# Patient Record
Sex: MALE | Race: WHITE
[De-identification: names, ages, dates, MRNs, and addresses within clinical notes are randomized per-mention and may not be internally consistent; named-entity substitution may affect disease eponyms.]

---

## 2018-07-11 PROBLEM — Z00.00 ENCOUNTER FOR PREVENTIVE HEALTH EXAMINATION: Status: ACTIVE | Noted: 2018-07-11

## 2018-07-17 ENCOUNTER — APPOINTMENT (OUTPATIENT)
Dept: OTOLARYNGOLOGY | Facility: CLINIC | Age: 68
End: 2018-07-17
Payer: MEDICARE

## 2018-07-17 VITALS
WEIGHT: 250 LBS | HEIGHT: 70 IN | BODY MASS INDEX: 35.79 KG/M2 | SYSTOLIC BLOOD PRESSURE: 110 MMHG | HEART RATE: 83 BPM | DIASTOLIC BLOOD PRESSURE: 79 MMHG

## 2018-07-17 DIAGNOSIS — E11.9 TYPE 2 DIABETES MELLITUS W/OUT COMPLICATIONS: ICD-10-CM

## 2018-07-17 DIAGNOSIS — Z78.9 OTHER SPECIFIED HEALTH STATUS: ICD-10-CM

## 2018-07-17 DIAGNOSIS — Z83.3 FAMILY HISTORY OF DIABETES MELLITUS: ICD-10-CM

## 2018-07-17 DIAGNOSIS — Z87.891 PERSONAL HISTORY OF NICOTINE DEPENDENCE: ICD-10-CM

## 2018-07-17 DIAGNOSIS — I10 ESSENTIAL (PRIMARY) HYPERTENSION: ICD-10-CM

## 2018-07-17 DIAGNOSIS — M89.8X9 OTHER SPECIFIED DISORDERS OF BONE, UNSPECIFIED SITE: ICD-10-CM

## 2018-07-17 DIAGNOSIS — Z82.49 FAMILY HISTORY OF ISCHEMIC HEART DISEASE AND OTHER DISEASES OF THE CIRCULATORY SYSTEM: ICD-10-CM

## 2018-07-17 DIAGNOSIS — Z80.0 FAMILY HISTORY OF MALIGNANT NEOPLASM OF DIGESTIVE ORGANS: ICD-10-CM

## 2018-07-17 PROCEDURE — 31231 NASAL ENDOSCOPY DX: CPT

## 2018-07-17 PROCEDURE — 99205 OFFICE O/P NEW HI 60 MIN: CPT | Mod: 25

## 2018-07-17 RX ORDER — METFORMIN HYDROCHLORIDE 500 MG/1
500 TABLET, COATED ORAL
Refills: 0 | Status: ACTIVE | COMMUNITY

## 2018-07-17 RX ORDER — LOSARTAN POTASSIUM AND HYDROCHLOROTHIAZIDE 12.5; 5 MG/1; MG/1
50-12.5 TABLET ORAL
Refills: 0 | Status: ACTIVE | COMMUNITY

## 2018-07-17 RX ORDER — INSULIN GLARGINE 100 [IU]/ML
100 INJECTION, SOLUTION SUBCUTANEOUS
Refills: 0 | Status: ACTIVE | COMMUNITY

## 2018-07-17 RX ORDER — FENOFIBRATE 160 MG/1
160 TABLET ORAL
Refills: 0 | Status: ACTIVE | COMMUNITY

## 2018-07-17 RX ORDER — METOPROLOL SUCCINATE 100 MG/1
100 TABLET, EXTENDED RELEASE ORAL
Refills: 0 | Status: ACTIVE | COMMUNITY

## 2018-07-17 RX ORDER — INSULIN ASPART 100 [IU]/ML
INJECTION, SOLUTION INTRAVENOUS; SUBCUTANEOUS
Refills: 0 | Status: ACTIVE | COMMUNITY

## 2018-07-23 ENCOUNTER — OUTPATIENT (OUTPATIENT)
Dept: OUTPATIENT SERVICES | Facility: HOSPITAL | Age: 68
LOS: 1 days | End: 2018-07-23
Payer: MEDICARE

## 2018-07-23 VITALS
HEART RATE: 72 BPM | HEIGHT: 68.5 IN | DIASTOLIC BLOOD PRESSURE: 90 MMHG | WEIGHT: 251.99 LBS | TEMPERATURE: 98 F | RESPIRATION RATE: 16 BRPM | SYSTOLIC BLOOD PRESSURE: 138 MMHG

## 2018-07-23 DIAGNOSIS — Z90.49 ACQUIRED ABSENCE OF OTHER SPECIFIED PARTS OF DIGESTIVE TRACT: Chronic | ICD-10-CM

## 2018-07-23 DIAGNOSIS — R22.0 LOCALIZED SWELLING, MASS AND LUMP, HEAD: ICD-10-CM

## 2018-07-23 DIAGNOSIS — E66.9 OBESITY, UNSPECIFIED: ICD-10-CM

## 2018-07-23 DIAGNOSIS — Z96.641 PRESENCE OF RIGHT ARTIFICIAL HIP JOINT: Chronic | ICD-10-CM

## 2018-07-23 DIAGNOSIS — I10 ESSENTIAL (PRIMARY) HYPERTENSION: ICD-10-CM

## 2018-07-23 DIAGNOSIS — E11.9 TYPE 2 DIABETES MELLITUS WITHOUT COMPLICATIONS: ICD-10-CM

## 2018-07-23 DIAGNOSIS — Z46.51 ENCOUNTER FOR FITTING AND ADJUSTMENT OF GASTRIC LAP BAND: Chronic | ICD-10-CM

## 2018-07-23 DIAGNOSIS — E78.5 HYPERLIPIDEMIA, UNSPECIFIED: ICD-10-CM

## 2018-07-23 LAB
BUN SERPL-MCNC: 14 MG/DL — SIGNIFICANT CHANGE UP (ref 7–23)
CALCIUM SERPL-MCNC: 9 MG/DL — SIGNIFICANT CHANGE UP (ref 8.4–10.5)
CHLORIDE SERPL-SCNC: 102 MMOL/L — SIGNIFICANT CHANGE UP (ref 98–107)
CO2 SERPL-SCNC: 26 MMOL/L — SIGNIFICANT CHANGE UP (ref 22–31)
CREAT SERPL-MCNC: 1.25 MG/DL — SIGNIFICANT CHANGE UP (ref 0.5–1.3)
GLUCOSE SERPL-MCNC: 130 MG/DL — HIGH (ref 70–99)
HBA1C BLD-MCNC: 5.9 % — HIGH (ref 4–5.6)
HCT VFR BLD CALC: 46.1 % — SIGNIFICANT CHANGE UP (ref 39–50)
HGB BLD-MCNC: 14.9 G/DL — SIGNIFICANT CHANGE UP (ref 13–17)
MCHC RBC-ENTMCNC: 28.9 PG — SIGNIFICANT CHANGE UP (ref 27–34)
MCHC RBC-ENTMCNC: 32.3 % — SIGNIFICANT CHANGE UP (ref 32–36)
MCV RBC AUTO: 89.3 FL — SIGNIFICANT CHANGE UP (ref 80–100)
NRBC # FLD: 0 — SIGNIFICANT CHANGE UP
PLATELET # BLD AUTO: 260 K/UL — SIGNIFICANT CHANGE UP (ref 150–400)
PMV BLD: 9.7 FL — SIGNIFICANT CHANGE UP (ref 7–13)
POTASSIUM SERPL-MCNC: 4.5 MMOL/L — SIGNIFICANT CHANGE UP (ref 3.5–5.3)
POTASSIUM SERPL-SCNC: 4.5 MMOL/L — SIGNIFICANT CHANGE UP (ref 3.5–5.3)
RBC # BLD: 5.16 M/UL — SIGNIFICANT CHANGE UP (ref 4.2–5.8)
RBC # FLD: 13.1 % — SIGNIFICANT CHANGE UP (ref 10.3–14.5)
SODIUM SERPL-SCNC: 141 MMOL/L — SIGNIFICANT CHANGE UP (ref 135–145)
WBC # BLD: 6.87 K/UL — SIGNIFICANT CHANGE UP (ref 3.8–10.5)
WBC # FLD AUTO: 6.87 K/UL — SIGNIFICANT CHANGE UP (ref 3.8–10.5)

## 2018-07-23 PROCEDURE — 93010 ELECTROCARDIOGRAM REPORT: CPT

## 2018-07-23 RX ORDER — SODIUM CHLORIDE 9 MG/ML
1000 INJECTION, SOLUTION INTRAVENOUS
Qty: 0 | Refills: 0 | Status: DISCONTINUED | OUTPATIENT
Start: 2018-07-25 | End: 2018-08-09

## 2018-07-23 RX ORDER — SODIUM CHLORIDE 9 MG/ML
3 INJECTION INTRAMUSCULAR; INTRAVENOUS; SUBCUTANEOUS EVERY 8 HOURS
Qty: 0 | Refills: 0 | Status: DISCONTINUED | OUTPATIENT
Start: 2018-07-25 | End: 2018-08-09

## 2018-07-23 NOTE — H&P PST ADULT - PROBLEM SELECTOR PLAN 1
Sublabial Biopsy of Premaxillary Tumor, to Direct Laryngoscopy, Esophagoscopy scheduled for 7/25/2018.  Pre-op instructions given. Pt verbalized understanding  Pepcid given for GI prophylaxis  Pending: Medical clearance (Abnormal EKG). Requesting copy of Stress/Echo if available Sublabial Biopsy of Premaxillary Tumor, to Direct Laryngoscopy, Esophagoscopy scheduled for 7/25/2018.  Pre-op instructions given. Pt verbalized understanding  Pepcid given for GI prophylaxis  Pending: Medical clearance (Abnormal EKG), requesting copy of Stress/Echo if available

## 2018-07-23 NOTE — H&P PST ADULT - CONSTITUTIONAL DETAILS
well-developed/well-groomed/no distress/well-nourished well-nourished/no distress/obese/well-developed/well-groomed

## 2018-07-23 NOTE — H&P PST ADULT - HISTORY OF PRESENT ILLNESS
69yo obese male with medical h/o HTN, T2Dm, HDL, reports in 6/2018 he noticed swelling to face (right cheek/nose region), he was evaluated and treated with antibiotics - denies nasal symptoms, or pain to nose/face. Pt presents today for presurgical testing for Sublabial Biopsy of Premaxillary Tumor, to Direct Laryngoscopy, Esophagoscopy scheduled for 7/25/2018. 69yo obese male with medical h/o HTN, T2Dm, HDL, reports in 6/2018 he noticed swelling to face (left cheek/nose region), he was evaluated and treated with antibiotics - denies nasal symptoms, or pain to nose/face. Pt presents today for presurgical testing for Sublabial Biopsy of Premaxillary Tumor, to Direct Laryngoscopy, Esophagoscopy scheduled for 7/25/2018.

## 2018-07-23 NOTE — H&P PST ADULT - PSH
Encounter for fitting of gastric lap band  2008  History of cholecystectomy    History of hip replacement, total, right  4/2018

## 2018-07-23 NOTE — H&P PST ADULT - FAMILY HISTORY
Father  Still living? Unknown  Diabetes mellitus, Age at diagnosis: Age Unknown     Mother  Still living? No  Diabetes mellitus, Age at diagnosis: Age Unknown

## 2018-07-23 NOTE — H&P PST ADULT - NSANTHOSAYNRD_GEN_A_CORE
No. QUOC screening performed.  STOP BANG Legend: 0-2 = LOW Risk; 3-4 = INTERMEDIATE Risk; 5-8 = HIGH Risk

## 2018-07-23 NOTE — H&P PST ADULT - PROBLEM SELECTOR PLAN 4
Accu-Chek ordered STAT for day of procedure  Glucose ordered STAT for day of procedure   Lantus, Novolog, Metformin instructions given Accu-Chek ordered STAT for day of procedure  Glucose ordered STAT for day of procedure   Lantus, Novolog, Metformin instructions given  OR notification done

## 2018-07-23 NOTE — H&P PST ADULT - NEGATIVE CARDIOVASCULAR SYMPTOMS
no orthopnea/no claudication/no dyspnea on exertion/no chest pain/no palpitations/no peripheral edema/no paroxysmal nocturnal dyspnea

## 2018-07-24 ENCOUNTER — TRANSCRIPTION ENCOUNTER (OUTPATIENT)
Age: 68
End: 2018-07-24

## 2018-07-25 ENCOUNTER — OUTPATIENT (OUTPATIENT)
Dept: OUTPATIENT SERVICES | Facility: HOSPITAL | Age: 68
LOS: 1 days | Discharge: HOME | End: 2018-07-25
Payer: MEDICARE

## 2018-07-25 ENCOUNTER — APPOINTMENT (OUTPATIENT)
Dept: OTOLARYNGOLOGY | Facility: HOSPITAL | Age: 68
End: 2018-07-25

## 2018-07-25 ENCOUNTER — RESULT REVIEW (OUTPATIENT)
Age: 68
End: 2018-07-25

## 2018-07-25 VITALS
SYSTOLIC BLOOD PRESSURE: 149 MMHG | HEART RATE: 51 BPM | DIASTOLIC BLOOD PRESSURE: 73 MMHG | OXYGEN SATURATION: 96 % | RESPIRATION RATE: 16 BRPM

## 2018-07-25 VITALS
RESPIRATION RATE: 16 BRPM | WEIGHT: 251.99 LBS | TEMPERATURE: 98 F | HEIGHT: 68.5 IN | OXYGEN SATURATION: 96 % | SYSTOLIC BLOOD PRESSURE: 134 MMHG | HEART RATE: 63 BPM | DIASTOLIC BLOOD PRESSURE: 72 MMHG

## 2018-07-25 DIAGNOSIS — Z46.51 ENCOUNTER FOR FITTING AND ADJUSTMENT OF GASTRIC LAP BAND: Chronic | ICD-10-CM

## 2018-07-25 DIAGNOSIS — Z96.641 PRESENCE OF RIGHT ARTIFICIAL HIP JOINT: Chronic | ICD-10-CM

## 2018-07-25 DIAGNOSIS — Z90.49 ACQUIRED ABSENCE OF OTHER SPECIFIED PARTS OF DIGESTIVE TRACT: Chronic | ICD-10-CM

## 2018-07-25 DIAGNOSIS — R22.0 LOCALIZED SWELLING, MASS AND LUMP, HEAD: ICD-10-CM

## 2018-07-25 PROCEDURE — 31231 NASAL ENDOSCOPY DX: CPT

## 2018-07-25 PROCEDURE — 88312 SPECIAL STAINS GROUP 1: CPT | Mod: 26

## 2018-07-25 PROCEDURE — 88331 PATH CONSLTJ SURG 1 BLK 1SPC: CPT | Mod: 26

## 2018-07-25 PROCEDURE — 88311 DECALCIFY TISSUE: CPT | Mod: 26

## 2018-07-25 PROCEDURE — 88360 TUMOR IMMUNOHISTOCHEM/MANUAL: CPT | Mod: 26

## 2018-07-25 PROCEDURE — 43191 ESOPHAGOSCOPY RIGID TRNSO DX: CPT

## 2018-07-25 PROCEDURE — 88342 IMHCHEM/IMCYTCHM 1ST ANTB: CPT | Mod: 26,59

## 2018-07-25 PROCEDURE — 21013 EXC FACE TUM DEEP < 2 CM: CPT

## 2018-07-25 PROCEDURE — 88305 TISSUE EXAM BY PATHOLOGIST: CPT | Mod: 26

## 2018-07-25 PROCEDURE — 88341 IMHCHEM/IMCYTCHM EA ADD ANTB: CPT | Mod: 26,59

## 2018-07-25 PROCEDURE — 31535 LARYNGOSCOPY W/BIOPSY: CPT

## 2018-07-25 RX ORDER — ONDANSETRON 8 MG/1
4 TABLET, FILM COATED ORAL ONCE
Qty: 0 | Refills: 0 | Status: DISCONTINUED | OUTPATIENT
Start: 2018-07-25 | End: 2018-07-25

## 2018-07-25 RX ORDER — CHLORHEXIDINE GLUCONATE 213 G/1000ML
15 SOLUTION TOPICAL
Qty: 900 | Refills: 0 | OUTPATIENT
Start: 2018-07-25 | End: 2018-08-08

## 2018-07-25 RX ORDER — LOSARTAN/HYDROCHLOROTHIAZIDE 100MG-25MG
1 TABLET ORAL
Qty: 0 | Refills: 0 | COMMUNITY

## 2018-07-25 RX ORDER — FENTANYL CITRATE 50 UG/ML
50 INJECTION INTRAVENOUS
Qty: 0 | Refills: 0 | Status: DISCONTINUED | OUTPATIENT
Start: 2018-07-25 | End: 2018-07-25

## 2018-07-25 RX ORDER — FENTANYL CITRATE 50 UG/ML
25 INJECTION INTRAVENOUS
Qty: 0 | Refills: 0 | Status: DISCONTINUED | OUTPATIENT
Start: 2018-07-25 | End: 2018-07-25

## 2018-07-25 RX ORDER — METOPROLOL TARTRATE 50 MG
1 TABLET ORAL
Qty: 0 | Refills: 0 | COMMUNITY

## 2018-07-25 RX ORDER — INSULIN ASPART 100 [IU]/ML
20 INJECTION, SOLUTION SUBCUTANEOUS
Qty: 0 | Refills: 0 | COMMUNITY

## 2018-07-25 RX ORDER — FENOFIBRATE,MICRONIZED 130 MG
1 CAPSULE ORAL
Qty: 0 | Refills: 0 | COMMUNITY

## 2018-07-25 RX ORDER — METFORMIN HYDROCHLORIDE 850 MG/1
2 TABLET ORAL
Qty: 0 | Refills: 0 | COMMUNITY

## 2018-07-25 RX ORDER — INSULIN GLARGINE 100 [IU]/ML
100 INJECTION, SOLUTION SUBCUTANEOUS
Qty: 0 | Refills: 0 | COMMUNITY

## 2018-07-25 NOTE — ASU DISCHARGE PLAN (ADULT/PEDIATRIC). - NOTIFY
Bleeding that does not stop/Fever greater than 101 Bleeding that does not stop/Persistent Nausea and Vomiting/Fever greater than 101

## 2018-07-25 NOTE — ASU DISCHARGE PLAN (ADULT/PEDIATRIC). - MEDICATION SUMMARY - MEDICATIONS TO TAKE
I will START or STAY ON the medications listed below when I get home from the hospital:    Percocet 5/325 oral tablet  -- 1 tab(s) by mouth every 6 hours MDD:4  -- Caution federal law prohibits the transfer of this drug to any person other  than the person for whom it was prescribed.  May cause drowsiness.  Alcohol may intensify this effect.  Use care when operating dangerous machinery.  This prescription cannot be refilled.  This product contains acetaminophen.  Do not use  with any other product containing acetaminophen to prevent possible liver damage.  Using more of this medication than prescribed may cause serious breathing problems.    -- Indication: For pain    metFORMIN 500 mg oral tablet, extended release  -- 2 tab(s) by mouth 2 times a day  -- Indication: For DM    Lantus 100 units/mL subcutaneous solution  -- 100 unit(s) subcutaneous once a day (at bedtime) PM  -- Indication: For DM    NovoLOG 100 units/mL subcutaneous solution  -- 20 unit(s) subcutaneous 3 times a day (with meals)  -- Indication: For DM    fenofibrate 160 mg oral tablet  -- 1 tab(s) by mouth once a day AM  -- Indication: For supplement    losartan-hydrochlorothiazide 50mg-12.5mg oral tablet  -- 1 tab(s) by mouth once a day AM  -- Indication: For HTN    chlorhexidine 0.12% mucous membrane liquid  -- 15 milliliter(s) by mouth 4 times a day (after meals and at bedtime) swish and spit  -- Indication: For oral care    Metoprolol Succinate ER 50 mg oral tablet, extended release  -- 1 tab(s) by mouth once a day  -- Indication: For HTN

## 2018-08-06 ENCOUNTER — TRANSCRIPTION ENCOUNTER (OUTPATIENT)
Age: 68
End: 2018-08-06

## 2018-08-07 ENCOUNTER — FORM ENCOUNTER (OUTPATIENT)
Age: 68
End: 2018-08-07

## 2018-08-07 PROBLEM — E11.9 TYPE 2 DIABETES MELLITUS WITHOUT COMPLICATIONS: Chronic | Status: ACTIVE | Noted: 2018-07-23

## 2018-08-07 PROBLEM — E66.9 OBESITY, UNSPECIFIED: Chronic | Status: ACTIVE | Noted: 2018-07-23

## 2018-08-07 PROBLEM — E78.5 HYPERLIPIDEMIA, UNSPECIFIED: Chronic | Status: ACTIVE | Noted: 2018-07-23

## 2018-08-07 PROBLEM — I10 ESSENTIAL (PRIMARY) HYPERTENSION: Chronic | Status: ACTIVE | Noted: 2018-07-23

## 2018-08-08 ENCOUNTER — APPOINTMENT (OUTPATIENT)
Dept: NUCLEAR MEDICINE | Facility: CLINIC | Age: 68
End: 2018-08-08
Payer: MEDICARE

## 2018-08-08 ENCOUNTER — OUTPATIENT (OUTPATIENT)
Dept: OUTPATIENT SERVICES | Facility: HOSPITAL | Age: 68
LOS: 1 days | End: 2018-08-08

## 2018-08-08 DIAGNOSIS — Z00.8 ENCOUNTER FOR OTHER GENERAL EXAMINATION: ICD-10-CM

## 2018-08-08 DIAGNOSIS — Z46.51 ENCOUNTER FOR FITTING AND ADJUSTMENT OF GASTRIC LAP BAND: Chronic | ICD-10-CM

## 2018-08-08 DIAGNOSIS — Z90.49 ACQUIRED ABSENCE OF OTHER SPECIFIED PARTS OF DIGESTIVE TRACT: Chronic | ICD-10-CM

## 2018-08-08 DIAGNOSIS — Z96.641 PRESENCE OF RIGHT ARTIFICIAL HIP JOINT: Chronic | ICD-10-CM

## 2018-08-08 PROCEDURE — 78306 BONE IMAGING WHOLE BODY: CPT | Mod: 26

## 2018-08-21 ENCOUNTER — APPOINTMENT (OUTPATIENT)
Dept: OTOLARYNGOLOGY | Facility: CLINIC | Age: 68
End: 2018-08-21
Payer: MEDICARE

## 2018-08-21 ENCOUNTER — FORM ENCOUNTER (OUTPATIENT)
Age: 68
End: 2018-08-21

## 2018-08-21 VITALS
DIASTOLIC BLOOD PRESSURE: 81 MMHG | HEART RATE: 81 BPM | SYSTOLIC BLOOD PRESSURE: 143 MMHG | WEIGHT: 250 LBS | BODY MASS INDEX: 35.87 KG/M2

## 2018-08-21 VITALS — WEIGHT: 238.38 LBS | BODY MASS INDEX: 34.2 KG/M2

## 2018-08-21 DIAGNOSIS — R22.0 LOCALIZED SWELLING, MASS AND LUMP, HEAD: ICD-10-CM

## 2018-08-21 PROCEDURE — 99024 POSTOP FOLLOW-UP VISIT: CPT

## 2018-08-22 ENCOUNTER — OUTPATIENT (OUTPATIENT)
Dept: OUTPATIENT SERVICES | Facility: HOSPITAL | Age: 68
LOS: 1 days | End: 2018-08-22
Payer: MEDICARE

## 2018-08-22 ENCOUNTER — APPOINTMENT (OUTPATIENT)
Dept: MRI IMAGING | Facility: CLINIC | Age: 68
End: 2018-08-22
Payer: MEDICARE

## 2018-08-22 DIAGNOSIS — Z90.49 ACQUIRED ABSENCE OF OTHER SPECIFIED PARTS OF DIGESTIVE TRACT: Chronic | ICD-10-CM

## 2018-08-22 DIAGNOSIS — Z00.8 ENCOUNTER FOR OTHER GENERAL EXAMINATION: ICD-10-CM

## 2018-08-22 DIAGNOSIS — Z96.641 PRESENCE OF RIGHT ARTIFICIAL HIP JOINT: Chronic | ICD-10-CM

## 2018-08-22 DIAGNOSIS — Z46.51 ENCOUNTER FOR FITTING AND ADJUSTMENT OF GASTRIC LAP BAND: Chronic | ICD-10-CM

## 2018-08-22 PROCEDURE — 70543 MRI ORBT/FAC/NCK W/O &W/DYE: CPT

## 2018-08-22 PROCEDURE — A9585: CPT

## 2018-08-22 PROCEDURE — 70543 MRI ORBT/FAC/NCK W/O &W/DYE: CPT | Mod: 26

## 2018-08-27 ENCOUNTER — LABORATORY RESULT (OUTPATIENT)
Age: 68
End: 2018-08-27

## 2018-08-27 ENCOUNTER — APPOINTMENT (OUTPATIENT)
Dept: OTOLARYNGOLOGY | Facility: CLINIC | Age: 68
End: 2018-08-27
Payer: MEDICARE

## 2018-08-27 VITALS — HEART RATE: 75 BPM | DIASTOLIC BLOOD PRESSURE: 80 MMHG | SYSTOLIC BLOOD PRESSURE: 150 MMHG

## 2018-08-27 DIAGNOSIS — R59.0 LOCALIZED ENLARGED LYMPH NODES: ICD-10-CM

## 2018-08-27 PROCEDURE — 10022: CPT

## 2018-08-27 PROCEDURE — 99214 OFFICE O/P EST MOD 30 MIN: CPT | Mod: 25

## 2018-08-27 PROCEDURE — 99024 POSTOP FOLLOW-UP VISIT: CPT | Mod: 25

## 2018-08-27 PROCEDURE — 76942 ECHO GUIDE FOR BIOPSY: CPT

## 2018-08-31 PROBLEM — R22.0 FACIAL MASS: Status: ACTIVE | Noted: 2018-07-17

## 2018-09-13 ENCOUNTER — APPOINTMENT (OUTPATIENT)
Dept: OTOLARYNGOLOGY | Facility: HOSPITAL | Age: 68
End: 2018-09-13

## 2018-10-02 ENCOUNTER — INBOUND DOCUMENT (OUTPATIENT)
Age: 68
End: 2018-10-02

## 2018-10-23 ENCOUNTER — INBOUND DOCUMENT (OUTPATIENT)
Age: 68
End: 2018-10-23

## 2019-05-08 ENCOUNTER — TRANSCRIPTION ENCOUNTER (OUTPATIENT)
Age: 69
End: 2019-05-08

## 2021-10-06 PROBLEM — I10 ESSENTIAL HYPERTENSION: Status: ACTIVE | Noted: 2018-07-17

## 2022-09-22 NOTE — H&P PST ADULT - NSSUBSTANCEUSE_GEN_ALL_CORE_SD
[facility] Consultation / Initial Visit      Today's Date: 9/22/2022    PCP: No Pcp  Referring Provider: Frank Martínez MD    INDICATION FOR CONSULTATION: need cardiac clearance preop      HPI:       67 year old male with HTN, emphysema, KIRAN, HLD, CAD s/p PCI of the LAD, and BPH. Presented to [facility] ED on 9/14/22 with a chief complaint of shortness of breath. Patient was admitted with mild generalized back pain achiness and sudden shortness of breath while on his way to a work conference. Patient was found to have a pneumothorax and  Patient was transferred to [facility] for further management. Patient is currently taking ASA 81 mg, metoprolol, and rosuvastatin at home. Denies chest pain, palpitations, headache, any hx of venous thrombosis, orthopnea or PND, extremity edema, and changes in physical activity. Former smoker for 45 years, quit 6 months ago.     Patient seen lying down in bed. He states that around a week ago he woke up and had shortness of breath. He also endorsed right side pain radiating from his neck. Patient is from [place] and is in town for a work conference. He was travelling to his conference via [employer] and he states that he could not speak \"more than 2 sentences\" to his  without feeling short of breath. He denies any chest pain. Patient had LAD stent placed in the past. He used to go to the gym regularly, however he stopped going around 10 years ago once he started his new job. He states he is not limited in any day-to-day activity.    ROS:     General: No fever or chills, No loss of appetite.    RS: No hemoptysis  GI: No melena or hematochezia  : No urinary disturbance  Derm: No skin disorders   Heme: No blood dyscrasias.  Remainder of 12 point systems reviewed and negative (or as mentioned in HPI)    Relevant Past Medical History:  Past Medical History:   Diagnosis Date   • BPH (benign prostatic hyperplasia)    • Coronary artery disease     w/stents   • Emphysema of lung (CMS/HCC)     • Emphysematous bleb (CMS/HCC)    • Essential (primary) hypertension    • High cholesterol       Past Surgical History:   Procedure Laterality Date   • Cardiac catherization          Social History:  Social History     Tobacco Use   Smoking Status Former Smoker   • Packs/day: 20.00   • Types: Cigarettes   • Quit date: 2022   • Years since quittin.2   Smokeless Tobacco Never Used     Social History     Substance and Sexual Activity   Alcohol Use Yes   • Alcohol/week: 1.0 standard drink   • Types: 1 Cans of beer per week     History   Drug Use Unknown       Family History:   History reviewed. No pertinent family history.    Inpatient Medications  Current Facility-Administered Medications   Medication Dose Route Frequency Provider Last Rate Last Admin   • sodium chloride (PF) 0.9 % injection 2 mL  2 mL Intracatheter 2 times per day Silas Lopez MD   2 mL at 22 0842   • enoxaparin (LOVENOX) injection 40 mg  40 mg Subcutaneous Daily Silas Lopez MD       • metoPROLOL succinate (TOPROL-XL) ER tablet 25 mg  25 mg Oral Daily Silas Lopez MD   25 mg at 22   • aspirin chewable 81 mg  81 mg Oral Daily Silas Lopez MD       • tamsulosin (FLOMAX) capsule 0.4 mg  0.4 mg Oral Daily PC Silas Lopez MD   0.4 mg at 22   • rosuvastatin (CRESTOR) tablet 40 mg  40 mg Oral Daily Silas Lopez MD   40 mg at 2224   • fluticasone-umeclidin-vilanterol (TRELEGY ELLIPTA) 100-62.5-25 MCG/INH inhaler 1 puff  1 puff Inhalation Daily Resp Silas Lopez MD       • Potassium Standard Replacement Protocol (Levels 3.5 and lower)   Does not apply See Admin Instructions Silas Lopez MD       • Potassium Replacement (Levels 3.6 - 4)   Does not apply See Admin Instructions Silas Lopez MD       • Magnesium Standard Replacement Protocol   Does not apply See Admin Instructions Silas Lopez MD       • Phosphorus Standard Replacement Protocol   Does not apply See Admin Instructions Silas Lopez MD       • influenza virus quadrivalent  vaccine inactivated (PRESERVATIVE FREE) injection 0.5 mL  0.5 mL Intramuscular Once Silas Lopez MD          Current Facility-Administered Medications   Medication Dose Route Frequency Provider Last Rate Last Admin   • lactated ringers infusion   Intravenous Continuous Delroy Turcios MD 75 mL/hr at 09/22/22 0659 Rate Verify at 09/22/22 0659      Current Facility-Administered Medications   Medication Dose Route Frequency Provider Last Rate Last Admin   • sodium chloride 0.9 % flush bag 25 mL  25 mL Intravenous PRN Silas Lopez MD       • ipratropium-albuterol (DUONEB) 0.5-2.5 (3) MG/3ML nebulizer solution 3 mL  3 mL Nebulization Q4H Resp PRN Silas Lopez MD       • sodium chloride 0.9 % flush bag 25 mL  25 mL Intravenous PRN Silas Lopez MD       • polyethylene glycol (MIRALAX) packet 17 g  17 g Oral Daily PRN Silas Lopez MD       • docusate sodium-sennosides (SENOKOT S) 50-8.6 MG 2 tablet  2 tablet Oral Daily PRN Silas Lopez MD       • acetaminophen (TYLENOL) tablet 650 mg  650 mg Oral Q4H PRN Silas Lopez MD   650 mg at 09/22/22 0101   • melatonin tablet 6 mg  6 mg Oral Nightly PRN Silas Lopez MD       • HYDROcodone-acetaminophen (NORCO) 5-325 MG per tablet 1 tablet  1 tablet Oral Q4H PRN YOLY Severino   1 tablet at 09/22/22 0839        Allergy   ALLERGIES:  No Known Allergies         Examination:      Vital Last Value 24 Hour Range   Temperature 97.2 °F (36.2 °C) (09/22/22 1123) Temp  Min: 97.2 °F (36.2 °C)  Max: 98.2 °F (36.8 °C)   Pulse (!) 57 (09/22/22 1200) Pulse  Min: 53  Max: 75   Respiratory (!) 21 (09/22/22 1200) Resp  Min: 15  Max: 25   Non-Invasive  Blood Pressure 121/79 (09/22/22 1200) BP  Min: 109/90  Max: 157/84   Pulse Oximetry 94 % (09/22/22 1200) SpO2  Min: 94 %  Max: 99 %   Arterial   Blood Pressure   No data recorded       Intake/Output Summary (Last 24 hours) at 9/22/2022 1343  Last data filed at 9/22/2022 1200  Gross per 24 hour   Intake 441.21 ml   Output 1500 ml   Net -1058.79 ml        Weight     09/21/22 2316   Weight: 86.2 kg (190 lb)         GENERAL: No apparent distress  HEENT: Normocephalic.  Neck:  Supple neck.   Oral mucosa : Pink and moist.    Endocrine: There is no goiter.  CVS: Regular rate and rhythm.  Normal first and second heart tones.   Lung fields: Clear to auscultation bilaterally.   GI: Soft. Nontender, nondistended.    Lower extremity: No cyanosis, clubbing or edema.   Peripheral vascular: Both lower extremities are warm and well perfused.    Neuro: Awake and alert.  Nonfocal examination.  Psych: Appropriate mood and affect  Integumentary: Warm and Dry      Clinical Data:       The following were personally visualized and interpreted by me:    LABS:    CBC  Recent Labs   Lab 09/22/22 0049   WBC 11.5*   HCT 46.6   HGB 15.4          CMP  Recent Labs   Lab 09/22/22 0049   SODIUM 141   POTASSIUM 4.7   CHLORIDE 108   CO2 31   GLUCOSE 110*   BUN 19   CREATININE 0.90   CALCIUM 9.2   TOTPROTEIN 6.1*   ALBUMIN 3.3*   BILIRUBIN 0.4   AST 20   GPT 30   ALKPT 49       Cardiac Labs  No results found    Lipid Panel  No results found    Coags  Recent Labs   Lab 09/22/22 0049   INR 1.0   PTT 28       ABG  No results found    IMAGING:    ECG:   Encounter Date: 09/21/22   Electrocardiogram 12-Lead   Result Value    Ventricular Rate EKG/Min (BPM) 54    Atrial Rate (BPM) 54    SD-Interval (MSEC) 134    QRS-Interval (MSEC) 84    QT-Interval (MSEC) 426    QTc 404    P Axis (Degrees) 64    R Axis (Degrees) 35    T Axis (Degrees) 29    REPORT TEXT      Sinus bradycardia  Otherwise normal ECG  No previous ECGs available  Confirmed by ASHELY LEE MD ([id number]) on 9/22/2022 1:12:37 PM          Carotid Duplex 2/24/22  Impression:   1.  Small amount of calcified plaque in both carotid bulbs, not   hemodynamically significant.   2.  Normal subclavian and vertebral flows on both sides.      Echo Stress Test 11/15/18  FINAL CONCLUSIONS   1- This is a normal stress echo. He did not have chest pain during the    test. Mildly abnormal ST change is likely a false positive response.   2- Exercise tolerance is good. The Mendoza score is >6, which is   considered low risk and associated with good prognosis.   3- hemodynamic response to exercise was appropriate.   4- The left ventricular systolic function is normal.   5- The resting echo is reported separately    TTE 11/15/18  CONCLUSIONS   1- Left ventricular systolic funciton is normal.   2- There is mild LVH with indeterminate diastolic function.   3- Right ventricle is borderline dilated with normal function.   4- There is biatrial enlargement.   5- Mild mitral and mild tricuspid regurgitation is noted.   6- Estimated pulmonary pressure is normal.   7- Compared to the previous images from 8/22/2017, no significant   changes are noted. The difference in the RV and RA measurements   are likely due to technical variability.       Cath Report:  No results found for this or any previous visit.      Assessment/Plans:     Shortness of Breath  Persistent Pneumothorax  - CT reviewed  - Suspected persistent air leak and pneumothorax due to ruptured bleb  - mgmt per thoracic surgery, plan for R VATS, bleb resection, mechanical pleurodesis, possible thoracotomy given the persistence of his symptoms      Pre-op Evaluation  - EKG ordered  - On 3L NC  - Denies anginal symptoms  - Echo from outside records 11/2018 shows normal LV function, mild mitral and tricuspid regurgitation, normal pulmonary pressure  - Normal stress echo in 2018   - Cardiac MRI 2019 was unremarkable  - Patient with good functional capacity, can climb 2 flight of stairs with no limitations  - Patient is intermediate risk for surgery no further cardiovascular workup indicated prior to surgery    Hypertension  - /72 this am  - On toprol-XL 25mg PO daily     Coronary Artery Disease s/p PCI of the LAD  - No chest pain or SOB  - Continue statin, metroprolol, aspirin    Hyperlipidemia  - On rosuvastatin 40mg PO daily  -  Lipid panel 21 revealed CHOL: 117, HDL: 43, LDL: 63, TRI    Emphysema  COPD  - mgmt per primary    Obstructive Sleep Apnea  - On CPAP    Benign Prostatic Hyperplasia  Emphysema  - mgmt per primary      Thank you for allowing us to participate in this patient's care.  Please do not hesitate to call with any questions or concerns.    Scribe Attestation: Entered by Ritesh Li acting as scribe for Dr. Carnes    The documentation recorded by the scribe accurately and completely reflects the service(s) I personally performed and the decisions made by me.        caffeine

## 2023-01-23 ENCOUNTER — OFFICE (OUTPATIENT)
Dept: URBAN - METROPOLITAN AREA CLINIC 12 | Facility: CLINIC | Age: 73
Setting detail: OPHTHALMOLOGY
End: 2023-01-23
Payer: MEDICARE

## 2023-01-23 DIAGNOSIS — H25.13: ICD-10-CM

## 2023-01-23 DIAGNOSIS — E11.3293: ICD-10-CM

## 2023-01-23 DIAGNOSIS — H35.372: ICD-10-CM

## 2023-01-23 PROCEDURE — 92014 COMPRE OPH EXAM EST PT 1/>: CPT | Performed by: OPHTHALMOLOGY

## 2023-01-23 PROCEDURE — 92250 FUNDUS PHOTOGRAPHY W/I&R: CPT | Performed by: OPHTHALMOLOGY

## 2023-01-23 ASSESSMENT — REFRACTION_MANIFEST
OS_SPHERE: +1.25
OS_AXIS: 67
OS_CYLINDER: -1.00
OD_ADD: +2.50
OD_VA1: 20/25
OD_SPHERE: +0.25
OS_ADD: +2.50
OD_CYLINDER: -0.75
OD_AXIS: 96

## 2023-01-23 ASSESSMENT — REFRACTION_AUTOREFRACTION
OS_CYLINDER: -1.00
OS_AXIS: 67
OD_CYLINDER: -0.75
OD_SPHERE: +0.25
OD_AXIS: 96
OS_SPHERE: +1.25

## 2023-01-23 ASSESSMENT — REFRACTION_CURRENTRX
OS_SPHERE: +1.25
OS_ADD: +1.50
OS_AXIS: 0
OS_CYLINDER: 0.00
OS_OVR_VA: 20/
OD_OVR_VA: 20/
OD_CYLINDER: -0.50
OD_ADD: +1.25
OD_AXIS: 106
OD_SPHERE: +1.25

## 2023-01-23 ASSESSMENT — KERATOMETRY
OS_AXISANGLE_DEGREES: 115
OD_K2POWER_DIOPTERS: 45.75
OD_K1POWER_DIOPTERS: 45.25
OS_K2POWER_DIOPTERS: 45.50
OD_AXISANGLE_DEGREES: 65
OS_K1POWER_DIOPTERS: 44.75

## 2023-01-23 ASSESSMENT — VISUAL ACUITY
OS_BCVA: 20/30-1
OD_BCVA: 20/30-1

## 2023-01-23 ASSESSMENT — TONOMETRY
OS_IOP_MMHG: 17
OD_IOP_MMHG: 15

## 2023-01-23 ASSESSMENT — CONFRONTATIONAL VISUAL FIELD TEST (CVF)
OS_FINDINGS: FULL
OD_FINDINGS: FULL

## 2023-01-23 ASSESSMENT — SPHEQUIV_DERIVED
OD_SPHEQUIV: -0.125
OS_SPHEQUIV: 0.75
OS_SPHEQUIV: 0.75
OD_SPHEQUIV: -0.125

## 2023-01-23 ASSESSMENT — AXIALLENGTH_DERIVED
OD_AL: 22.9254
OS_AL: 22.7351
OD_AL: 22.9254
OS_AL: 22.7351

## 2023-05-15 NOTE — H&P PST ADULT - PMH
I have reviewed discharge instructions with the patient and spouse. The patient and spouse verbalized understanding. Diabetes mellitus    Hyperlipidemia    Hypertension    Obesity

## 2023-10-16 ENCOUNTER — OFFICE (OUTPATIENT)
Dept: URBAN - METROPOLITAN AREA CLINIC 12 | Facility: CLINIC | Age: 73
Setting detail: OPHTHALMOLOGY
End: 2023-10-16
Payer: MEDICARE

## 2023-10-16 DIAGNOSIS — E11.3293: ICD-10-CM

## 2023-10-16 DIAGNOSIS — H25.13: ICD-10-CM

## 2023-10-16 DIAGNOSIS — H35.372: ICD-10-CM

## 2023-10-16 PROCEDURE — 92134 CPTRZ OPH DX IMG PST SGM RTA: CPT | Performed by: OPHTHALMOLOGY

## 2023-10-16 PROCEDURE — 99214 OFFICE O/P EST MOD 30 MIN: CPT | Performed by: OPHTHALMOLOGY

## 2023-10-16 ASSESSMENT — REFRACTION_CURRENTRX
OS_AXIS: 0
OS_ADD: +1.50
OD_OVR_VA: 20/
OD_ADD: +1.25
OD_AXIS: 106
OS_OVR_VA: 20/
OS_CYLINDER: 0.00
OD_CYLINDER: -0.50
OD_SPHERE: +1.25
OS_SPHERE: +1.25

## 2023-10-16 ASSESSMENT — KERATOMETRY
OD_K2POWER_DIOPTERS: 45.50
OS_K2POWER_DIOPTERS: 45.25
OD_K1POWER_DIOPTERS: 45.25
OD_AXISANGLE_DEGREES: 083
OS_K1POWER_DIOPTERS: 45.00
OS_AXISANGLE_DEGREES: 133

## 2023-10-16 ASSESSMENT — REFRACTION_MANIFEST
OS_CYLINDER: -1.25
OD_SPHERE: PLANO
OS_AXIS: 67
OS_SPHERE: +1.25
OS_ADD: +2.50
OS_VA1: 20/25-1
OS_SPHERE: +1.25
OS_CYLINDER: -1.00
OD_AXIS: 089
OD_CYLINDER: -0.75
OD_VA1: 20/20
OD_AXIS: 96
OD_SPHERE: +0.25
OD_VA1: 20/25
OD_CYLINDER: -0.75
OS_AXIS: 073
OD_ADD: +2.50

## 2023-10-16 ASSESSMENT — VISUAL ACUITY
OS_BCVA: 20/40
OD_BCVA: 20/30-1

## 2023-10-16 ASSESSMENT — AXIALLENGTH_DERIVED
OD_AL: 22.9688
OS_AL: 22.7351
OS_AL: 22.7804
OS_AL: 22.7804

## 2023-10-16 ASSESSMENT — REFRACTION_AUTOREFRACTION
OD_SPHERE: PLANO
OD_CYLINDER: -0.75
OD_AXIS: 089
OS_SPHERE: +1.25
OS_AXIS: 073
OS_CYLINDER: -1.25

## 2023-10-16 ASSESSMENT — SPHEQUIV_DERIVED
OS_SPHEQUIV: 0.75
OD_SPHEQUIV: -0.125
OS_SPHEQUIV: 0.625
OS_SPHEQUIV: 0.625

## 2023-10-16 ASSESSMENT — TONOMETRY
OS_IOP_MMHG: 14
OD_IOP_MMHG: 14

## 2023-10-16 ASSESSMENT — CONFRONTATIONAL VISUAL FIELD TEST (CVF)
OS_FINDINGS: FULL
OD_FINDINGS: FULL

## 2023-10-23 ENCOUNTER — OFFICE (OUTPATIENT)
Dept: URBAN - METROPOLITAN AREA CLINIC 12 | Facility: CLINIC | Age: 73
Setting detail: OPHTHALMOLOGY
End: 2023-10-23
Payer: MEDICARE

## 2023-10-23 DIAGNOSIS — H25.13: ICD-10-CM

## 2023-10-23 DIAGNOSIS — E11.3293: ICD-10-CM

## 2023-10-23 DIAGNOSIS — H25.12: ICD-10-CM

## 2023-10-23 PROCEDURE — 92136 OPHTHALMIC BIOMETRY: CPT | Mod: 26,LT | Performed by: OPHTHALMOLOGY

## 2023-10-23 PROCEDURE — 92136 OPHTHALMIC BIOMETRY: CPT | Mod: TC | Performed by: OPHTHALMOLOGY

## 2023-10-23 PROCEDURE — 99213 OFFICE O/P EST LOW 20 MIN: CPT | Performed by: OPHTHALMOLOGY

## 2023-10-23 ASSESSMENT — REFRACTION_MANIFEST
OS_ADD: +2.50
OD_SPHERE: PLANO
OD_VA1: 20/20
OD_AXIS: 96
OS_SPHERE: +1.25
OS_VA1: 20/25-1
OD_CYLINDER: -0.75
OS_CYLINDER: -1.00
OD_ADD: +2.50
OS_CYLINDER: -1.25
OS_SPHERE: +1.25
OS_AXIS: 073
OD_CYLINDER: -0.75
OD_AXIS: 089
OD_SPHERE: +0.25
OS_AXIS: 67
OD_VA1: 20/25

## 2023-10-23 ASSESSMENT — SPHEQUIV_DERIVED
OS_SPHEQUIV: 0.625
OS_SPHEQUIV: 0.75
OS_SPHEQUIV: 0.625
OD_SPHEQUIV: -0.125

## 2023-10-23 ASSESSMENT — REFRACTION_AUTOREFRACTION
OS_SPHERE: +1.00
OD_CYLINDER: -0.75
OD_SPHERE: PLANO
OD_AXIS: 107
OS_CYLINDER: -0.75
OS_AXIS: 066

## 2023-10-23 ASSESSMENT — REFRACTION_CURRENTRX
OS_CYLINDER: 0.00
OD_CYLINDER: -0.50
OS_ADD: +1.50
OS_SPHERE: +1.25
OS_AXIS: 0
OD_ADD: +1.25
OD_OVR_VA: 20/
OD_AXIS: 106
OD_SPHERE: +1.25
OS_OVR_VA: 20/

## 2023-10-23 ASSESSMENT — TONOMETRY
OS_IOP_MMHG: 12
OD_IOP_MMHG: 12

## 2023-10-23 ASSESSMENT — KERATOMETRY
OS_K2POWER_DIOPTERS: 45.25
OD_AXISANGLE_DEGREES: 022
OS_AXISANGLE_DEGREES: 110
OS_K1POWER_DIOPTERS: 45.00
OD_K1POWER_DIOPTERS: 45.00
OD_K2POWER_DIOPTERS: 45.75

## 2023-10-23 ASSESSMENT — CONFRONTATIONAL VISUAL FIELD TEST (CVF)
OD_FINDINGS: FULL
OS_FINDINGS: FULL

## 2023-10-23 ASSESSMENT — VISUAL ACUITY
OD_BCVA: 20/30+2
OS_BCVA: 20/40

## 2023-10-23 ASSESSMENT — AXIALLENGTH_DERIVED
OD_AL: 22.9688
OS_AL: 22.7351
OS_AL: 22.7804
OS_AL: 22.7804

## 2023-10-31 ENCOUNTER — ASC (OUTPATIENT)
Dept: URBAN - METROPOLITAN AREA SURGERY 8 | Facility: SURGERY | Age: 73
Setting detail: OPHTHALMOLOGY
End: 2023-10-31
Payer: MEDICARE

## 2023-10-31 DIAGNOSIS — H52.212: ICD-10-CM

## 2023-10-31 DIAGNOSIS — H25.12: ICD-10-CM

## 2023-10-31 PROCEDURE — 66984 XCAPSL CTRC RMVL W/O ECP: CPT | Mod: LT | Performed by: OPHTHALMOLOGY

## 2023-10-31 PROCEDURE — 68841 INSJ RX ELUT IMPLT LAC CANAL: CPT | Mod: LT | Performed by: OPHTHALMOLOGY

## 2023-10-31 PROCEDURE — A9270 NON-COVERED ITEM OR SERVICE: HCPCS | Mod: GY | Performed by: OPHTHALMOLOGY

## 2023-10-31 PROCEDURE — FEMTO FEMTOSECOND LASER: Mod: GY | Performed by: OPHTHALMOLOGY

## 2023-11-01 ENCOUNTER — RX ONLY (RX ONLY)
Age: 73
End: 2023-11-01

## 2023-11-01 ENCOUNTER — OFFICE (OUTPATIENT)
Dept: URBAN - METROPOLITAN AREA CLINIC 12 | Facility: CLINIC | Age: 73
Setting detail: OPHTHALMOLOGY
End: 2023-11-01
Payer: MEDICARE

## 2023-11-01 DIAGNOSIS — Z96.1: ICD-10-CM

## 2023-11-01 PROBLEM — H25.11 CATARACT SENILE NUCLEAR SCLEROSIS; RIGHT EYE: Status: ACTIVE | Noted: 2023-10-16

## 2023-11-01 PROCEDURE — 99024 POSTOP FOLLOW-UP VISIT: CPT | Performed by: OPTOMETRIST

## 2023-11-01 ASSESSMENT — REFRACTION_CURRENTRX
OD_SPHERE: +1.25
OD_OVR_VA: 20/
OS_CYLINDER: 0.00
OD_ADD: +1.25
OS_OVR_VA: 20/
OD_CYLINDER: -0.50
OD_AXIS: 106
OS_AXIS: 0
OS_ADD: +1.50
OS_SPHERE: +1.25

## 2023-11-01 ASSESSMENT — REFRACTION_MANIFEST
OD_VA1: 20/20
OS_AXIS: 67
OS_CYLINDER: -1.25
OS_SPHERE: +1.25
OD_SPHERE: +0.25
OD_AXIS: 96
OD_SPHERE: PLANO
OD_AXIS: 089
OD_CYLINDER: -0.75
OS_VA1: 20/25-1
OS_CYLINDER: -1.00
OS_AXIS: 073
OD_ADD: +2.50
OD_CYLINDER: -0.75
OS_SPHERE: +1.25
OD_VA1: 20/25
OS_ADD: +2.50

## 2023-11-01 ASSESSMENT — REFRACTION_AUTOREFRACTION
OS_SPHERE: -0.25
OD_CYLINDER: -0.75
OS_CYLINDER: -0.75
OD_AXIS: 112
OD_SPHERE: -0.25
OS_AXIS: 037

## 2023-11-01 ASSESSMENT — CONFRONTATIONAL VISUAL FIELD TEST (CVF)
OS_FINDINGS: FULL
OD_FINDINGS: FULL

## 2023-11-01 ASSESSMENT — SPHEQUIV_DERIVED
OS_SPHEQUIV: 0.625
OD_SPHEQUIV: -0.125
OS_SPHEQUIV: -0.625
OD_SPHEQUIV: -0.625
OS_SPHEQUIV: 0.75

## 2023-11-09 ENCOUNTER — OFFICE (OUTPATIENT)
Dept: URBAN - METROPOLITAN AREA CLINIC 12 | Facility: CLINIC | Age: 73
Setting detail: OPHTHALMOLOGY
End: 2023-11-09
Payer: MEDICARE

## 2023-11-09 DIAGNOSIS — H25.11: ICD-10-CM

## 2023-11-09 PROCEDURE — 92136 OPHTHALMIC BIOMETRY: CPT | Mod: 26,RT | Performed by: OPHTHALMOLOGY

## 2023-11-09 ASSESSMENT — SPHEQUIV_DERIVED
OD_SPHEQUIV: -0.125
OS_SPHEQUIV: -0.125
OS_SPHEQUIV: 0.625
OD_SPHEQUIV: -0.625
OS_SPHEQUIV: 0.75

## 2023-11-09 ASSESSMENT — REFRACTION_MANIFEST
OD_SPHERE: PLANO
OS_CYLINDER: -1.00
OS_ADD: +2.50
OD_CYLINDER: -0.75
OD_SPHERE: +0.25
OD_CYLINDER: -0.75
OD_ADD: +2.50
OD_VA1: 20/20
OS_AXIS: 073
OD_AXIS: 96
OS_VA1: 20/25-1
OD_AXIS: 089
OS_CYLINDER: -1.25
OS_SPHERE: +1.25
OS_AXIS: 67
OS_SPHERE: +1.25
OD_VA1: 20/25

## 2023-11-09 ASSESSMENT — REFRACTION_AUTOREFRACTION
OS_AXIS: 064
OD_SPHERE: -0.25
OD_AXIS: 096
OS_CYLINDER: -0.75
OS_SPHERE: +0.25
OD_CYLINDER: -0.75

## 2023-11-09 ASSESSMENT — REFRACTION_CURRENTRX
OS_AXIS: 0
OD_CYLINDER: -0.50
OD_SPHERE: +1.25
OD_OVR_VA: 20/
OS_ADD: +1.50
OS_SPHERE: +1.25
OS_CYLINDER: 0.00
OS_OVR_VA: 20/
OD_AXIS: 106
OD_ADD: +1.25

## 2023-11-09 ASSESSMENT — CONFRONTATIONAL VISUAL FIELD TEST (CVF)
OS_FINDINGS: FULL
OD_FINDINGS: FULL

## 2023-11-14 ENCOUNTER — ASC (OUTPATIENT)
Dept: URBAN - METROPOLITAN AREA SURGERY 8 | Facility: SURGERY | Age: 73
Setting detail: OPHTHALMOLOGY
End: 2023-11-14
Payer: MEDICARE

## 2023-11-14 DIAGNOSIS — H52.211: ICD-10-CM

## 2023-11-14 DIAGNOSIS — H25.11: ICD-10-CM

## 2023-11-14 PROCEDURE — FEMTO FEMTOSECOND LASER: Mod: GY | Performed by: OPHTHALMOLOGY

## 2023-11-14 PROCEDURE — A9270 NON-COVERED ITEM OR SERVICE: HCPCS | Mod: GY | Performed by: OPHTHALMOLOGY

## 2023-11-14 PROCEDURE — 66984 XCAPSL CTRC RMVL W/O ECP: CPT | Mod: 79,RT | Performed by: OPHTHALMOLOGY

## 2023-11-14 PROCEDURE — 68841 INSJ RX ELUT IMPLT LAC CANAL: CPT | Mod: 79,RT | Performed by: OPHTHALMOLOGY

## 2023-11-15 ENCOUNTER — OFFICE (OUTPATIENT)
Dept: URBAN - METROPOLITAN AREA CLINIC 12 | Facility: CLINIC | Age: 73
Setting detail: OPHTHALMOLOGY
End: 2023-11-15
Payer: MEDICARE

## 2023-11-15 DIAGNOSIS — Z96.1: ICD-10-CM

## 2023-11-15 PROCEDURE — 99024 POSTOP FOLLOW-UP VISIT: CPT | Performed by: OPTOMETRIST

## 2023-11-15 ASSESSMENT — REFRACTION_AUTOREFRACTION
OD_AXIS: 106
OD_SPHERE: +0.50
OS_CYLINDER: -1.00
OD_CYLINDER: -1.00
OS_AXIS: 69
OS_SPHERE: +0.50

## 2023-11-15 ASSESSMENT — REFRACTION_MANIFEST
OS_AXIS: 073
OD_VA1: 20/20
OD_SPHERE: +0.25
OS_CYLINDER: -1.25
OD_AXIS: 96
OD_VA1: 20/25
OD_AXIS: 089
OS_VA1: 20/25-1
OD_SPHERE: PLANO
OD_ADD: +2.50
OD_CYLINDER: -0.75
OD_CYLINDER: -0.75
OS_SPHERE: +1.25
OS_AXIS: 67
OS_SPHERE: +1.25
OS_CYLINDER: -1.00
OS_ADD: +2.50

## 2023-11-15 ASSESSMENT — SPHEQUIV_DERIVED
OD_SPHEQUIV: -0.125
OS_SPHEQUIV: 0.625
OS_SPHEQUIV: 0
OS_SPHEQUIV: 0.75
OD_SPHEQUIV: 0

## 2023-11-15 ASSESSMENT — CONFRONTATIONAL VISUAL FIELD TEST (CVF)
OS_FINDINGS: FULL
OD_FINDINGS: FULL

## 2023-11-15 ASSESSMENT — REFRACTION_CURRENTRX
OS_SPHERE: +1.25
OD_SPHERE: +1.25
OD_AXIS: 106
OS_AXIS: 0
OS_OVR_VA: 20/
OD_ADD: +1.25
OS_CYLINDER: 0.00
OD_CYLINDER: -0.50
OD_OVR_VA: 20/
OS_ADD: +1.50

## 2023-11-21 ENCOUNTER — OFFICE (OUTPATIENT)
Dept: URBAN - METROPOLITAN AREA CLINIC 12 | Facility: CLINIC | Age: 73
Setting detail: OPHTHALMOLOGY
End: 2023-11-21
Payer: MEDICARE

## 2023-11-21 DIAGNOSIS — Z96.1: ICD-10-CM

## 2023-11-21 PROCEDURE — 99024 POSTOP FOLLOW-UP VISIT: CPT | Performed by: OPTOMETRIST

## 2023-11-21 ASSESSMENT — REFRACTION_MANIFEST
OD_CYLINDER: -0.75
OS_AXIS: 073
OD_AXIS: 089
OD_SPHERE: PLANO
OS_CYLINDER: -1.00
OD_SPHERE: +0.25
OS_AXIS: 67
OS_CYLINDER: -1.25
OD_ADD: +2.50
OD_VA1: 20/20
OD_CYLINDER: -0.75
OS_ADD: +2.50
OD_VA1: 20/25
OS_SPHERE: +1.25
OD_AXIS: 96
OS_SPHERE: +1.25
OS_VA1: 20/25-1

## 2023-11-21 ASSESSMENT — CONFRONTATIONAL VISUAL FIELD TEST (CVF)
OD_FINDINGS: FULL
OS_FINDINGS: FULL

## 2023-11-21 ASSESSMENT — REFRACTION_CURRENTRX
OS_OVR_VA: 20/
OD_SPHERE: +1.25
OD_CYLINDER: -0.50
OD_OVR_VA: 20/
OS_AXIS: 0
OD_ADD: +1.25
OS_CYLINDER: 0.00
OD_AXIS: 106
OS_SPHERE: +1.25
OS_ADD: +1.50

## 2023-11-21 ASSESSMENT — REFRACTION_AUTOREFRACTION
OS_AXIS: 049
OS_SPHERE: +0.25
OD_CYLINDER: -0.50
OS_CYLINDER: -1.00
OD_SPHERE: -0.25
OD_AXIS: 114

## 2023-11-21 ASSESSMENT — SPHEQUIV_DERIVED
OS_SPHEQUIV: -0.25
OS_SPHEQUIV: 0.625
OD_SPHEQUIV: -0.125
OS_SPHEQUIV: 0.75
OD_SPHEQUIV: -0.5

## 2023-12-14 ENCOUNTER — OFFICE (OUTPATIENT)
Dept: URBAN - METROPOLITAN AREA CLINIC 12 | Facility: CLINIC | Age: 73
Setting detail: OPHTHALMOLOGY
End: 2023-12-14
Payer: MEDICARE

## 2023-12-14 DIAGNOSIS — H26.493: ICD-10-CM

## 2023-12-14 DIAGNOSIS — Z96.1: ICD-10-CM

## 2023-12-14 PROCEDURE — 99024 POSTOP FOLLOW-UP VISIT: CPT | Performed by: OPTOMETRIST

## 2023-12-14 ASSESSMENT — REFRACTION_MANIFEST
OS_ADD: +2.50
OD_AXIS: 089
OS_SPHERE: +1.25
OD_VA1: 20/20
OD_CYLINDER: -0.75
OD_VA1: 20/25
OD_AXIS: 96
OD_SPHERE: PLANO
OS_CYLINDER: -1.25
OS_CYLINDER: -1.00
OD_CYLINDER: -0.75
OS_AXIS: 073
OS_VA1: 20/25-1
OD_ADD: +2.50
OS_SPHERE: +1.25
OD_SPHERE: +0.25
OS_AXIS: 67

## 2023-12-14 ASSESSMENT — REFRACTION_CURRENTRX
OS_ADD: +1.50
OD_OVR_VA: 20/
OS_AXIS: 0
OD_ADD: +1.25
OD_CYLINDER: -0.50
OS_SPHERE: +1.25
OD_AXIS: 106
OS_CYLINDER: 0.00
OS_OVR_VA: 20/
OD_SPHERE: +1.25

## 2023-12-14 ASSESSMENT — REFRACTION_AUTOREFRACTION
OD_SPHERE: -0.25
OD_CYLINDER: -0.50
OS_CYLINDER: -1.00
OS_SPHERE: +0.25
OS_AXIS: 55
OD_AXIS: 116

## 2023-12-14 ASSESSMENT — SPHEQUIV_DERIVED
OS_SPHEQUIV: -0.25
OS_SPHEQUIV: 0.75
OD_SPHEQUIV: -0.125
OS_SPHEQUIV: 0.625
OD_SPHEQUIV: -0.5

## 2023-12-14 ASSESSMENT — CONFRONTATIONAL VISUAL FIELD TEST (CVF)
OD_FINDINGS: FULL
OS_FINDINGS: FULL

## 2024-01-01 NOTE — ASU DISCHARGE PLAN (ADULT/PEDIATRIC). - ASU FOLLOWUP
PC to mother to update on transport to [facility] status. ETD from [facility] is 1300, approximate ETA to [facility] ~1430.  Pt name and ID verified.   BRENNA TUCKER (Adult):/PACU [phone]

## 2025-01-07 NOTE — ASU PREOP CHECKLIST - HEIGHT IN FEET
Caro Patient was in this morning and forgot to tell Caro that he needs something for thrush. Ade sotelo      
Patient was in this morning and forgot to tell Caro that he needs something for thrush  
Sent in RX for Nystatin to [facility]  
5

## 2025-06-18 ENCOUNTER — OFFICE (OUTPATIENT)
Dept: URBAN - METROPOLITAN AREA CLINIC 12 | Facility: CLINIC | Age: 75
Setting detail: OPHTHALMOLOGY
End: 2025-06-18
Payer: MEDICARE

## 2025-06-18 DIAGNOSIS — H35.372: ICD-10-CM

## 2025-06-18 DIAGNOSIS — E11.3293: ICD-10-CM

## 2025-06-18 DIAGNOSIS — Z96.1: ICD-10-CM

## 2025-06-18 DIAGNOSIS — H26.493: ICD-10-CM

## 2025-06-18 PROCEDURE — 92250 FUNDUS PHOTOGRAPHY W/I&R: CPT | Performed by: OPHTHALMOLOGY

## 2025-06-18 PROCEDURE — 92014 COMPRE OPH EXAM EST PT 1/>: CPT | Performed by: OPHTHALMOLOGY

## 2025-06-18 ASSESSMENT — REFRACTION_CURRENTRX
OS_ADD: +1.50
OS_OVR_VA: 20/
OD_AXIS: 106
OD_SPHERE: +1.25
OS_CYLINDER: 0.00
OD_ADD: +1.25
OS_AXIS: 0
OS_SPHERE: +1.25
OD_CYLINDER: -0.50
OD_OVR_VA: 20/

## 2025-06-18 ASSESSMENT — REFRACTION_MANIFEST
OD_AXIS: 96
OD_CYLINDER: -0.50
OS_SPHERE: +0.50
OS_ADD: +2.50
OD_ADD: +2.50
OD_CYLINDER: -0.75
OS_AXIS: 073
OS_CYLINDER: -1.00
OD_SPHERE: +0.25
OS_AXIS: 055
OD_VA1: 20/20
OD_VA1: 20/NI
OS_CYLINDER: -1.00
OD_SPHERE: +0.25
OD_AXIS: 105
OS_VA1: 20/25-1
OS_AXIS: 67
OS_SPHERE: +1.25
OS_SPHERE: +1.25
OD_AXIS: 089
OD_SPHERE: PLANO
OD_VA1: 20/25
OS_VA1: 20/25-1
OS_CYLINDER: -1.25
OD_CYLINDER: -0.75

## 2025-06-18 ASSESSMENT — CONFRONTATIONAL VISUAL FIELD TEST (CVF)
OS_FINDINGS: FULL
OD_FINDINGS: FULL

## 2025-06-18 ASSESSMENT — KERATOMETRY
OS_K1POWER_DIOPTERS: 44.50
METHOD_AUTO_MANUAL: AUTO
OD_K2POWER_DIOPTERS: 45.75
OS_AXISANGLE_DEGREES: 119
OD_K1POWER_DIOPTERS: 45.25
OD_AXISANGLE_DEGREES: 101
OS_K2POWER_DIOPTERS: 45.25

## 2025-06-18 ASSESSMENT — TONOMETRY
OD_IOP_MMHG: 11
OS_IOP_MMHG: 19

## 2025-06-18 ASSESSMENT — VISUAL ACUITY
OD_BCVA: 20/60-1
OS_BCVA: 20/20-1

## 2025-06-18 ASSESSMENT — REFRACTION_AUTOREFRACTION
OS_CYLINDER: -1.00
OD_SPHERE: +0.25
OS_SPHERE: +0.50
OD_CYLINDER: -0.50
OD_AXIS: 105
OS_AXIS: 056

## 2025-06-20 ENCOUNTER — OFFICE (OUTPATIENT)
Dept: URBAN - METROPOLITAN AREA CLINIC 100 | Facility: CLINIC | Age: 75
Setting detail: OPHTHALMOLOGY
End: 2025-06-20
Payer: MEDICARE

## 2025-06-20 DIAGNOSIS — H02.821: ICD-10-CM

## 2025-06-20 PROCEDURE — 92285 EXTERNAL OCULAR PHOTOGRAPHY: CPT | Performed by: STUDENT IN AN ORGANIZED HEALTH CARE EDUCATION/TRAINING PROGRAM

## 2025-06-20 PROCEDURE — 67840 REMOVE EYELID LESION: CPT | Mod: RT | Performed by: STUDENT IN AN ORGANIZED HEALTH CARE EDUCATION/TRAINING PROGRAM

## 2025-06-20 ASSESSMENT — REFRACTION_AUTOREFRACTION
OS_AXIS: 056
OS_CYLINDER: -1.00
OD_AXIS: 105
OD_CYLINDER: -0.50
OS_SPHERE: +0.50
OD_SPHERE: +0.25

## 2025-06-20 ASSESSMENT — CONFRONTATIONAL VISUAL FIELD TEST (CVF)
OS_FINDINGS: FULL
OD_FINDINGS: FULL

## 2025-06-20 ASSESSMENT — KERATOMETRY
OS_K2POWER_DIOPTERS: 45.25
OD_AXISANGLE_DEGREES: 101
OS_AXISANGLE_DEGREES: 119
OS_K1POWER_DIOPTERS: 44.50
OD_K1POWER_DIOPTERS: 45.25
METHOD_AUTO_MANUAL: AUTO
OD_K2POWER_DIOPTERS: 45.75

## 2025-06-20 ASSESSMENT — VISUAL ACUITY
OS_BCVA: 20/20
OD_BCVA: 20/25+